# Patient Record
Sex: FEMALE | Race: WHITE | Employment: UNEMPLOYED | URBAN - METROPOLITAN AREA
[De-identification: names, ages, dates, MRNs, and addresses within clinical notes are randomized per-mention and may not be internally consistent; named-entity substitution may affect disease eponyms.]

---

## 2018-09-22 ENCOUNTER — HOSPITAL ENCOUNTER (OUTPATIENT)
Dept: MAMMOGRAPHY | Age: 38
Discharge: HOME | End: 2018-09-26
Payer: COMMERCIAL

## 2018-09-22 DIAGNOSIS — Z12.31 VISIT FOR SCREENING MAMMOGRAM: ICD-10-CM

## 2018-09-22 PROCEDURE — 77067 SCR MAMMO BI INCL CAD: CPT

## 2018-10-03 ENCOUNTER — HOSPITAL ENCOUNTER (OUTPATIENT)
Dept: ULTRASOUND IMAGING | Age: 38
Discharge: HOME | End: 2018-10-03
Payer: COMMERCIAL

## 2018-10-03 ENCOUNTER — HOSPITAL ENCOUNTER (OUTPATIENT)
Dept: WOMENS IMAGING | Age: 38
Discharge: HOME | End: 2018-10-03
Payer: COMMERCIAL

## 2018-10-03 DIAGNOSIS — R92.8 ABNORMAL FINDING ON MAMMOGRAPHY: ICD-10-CM

## 2018-10-03 PROCEDURE — G0279 TOMOSYNTHESIS, MAMMO: HCPCS

## 2018-10-03 PROCEDURE — 76642 ULTRASOUND BREAST LIMITED: CPT

## 2022-06-10 ENCOUNTER — TELEPHONE (OUTPATIENT)
Dept: ENDOCRINOLOGY | Age: 42
End: 2022-06-10

## 2022-06-10 NOTE — TELEPHONE ENCOUNTER
Fax coming from [facility] physicians sending referral to see Dr. Ana Bustillos     Dx - type 2 diabetes mellitus with hyperglycemia    Referring Dr Karan Price

## 2023-01-11 PROBLEM — I10 PRIMARY HYPERTENSION: Status: ACTIVE | Noted: 2023-01-11

## 2023-01-11 PROBLEM — E66.9 CLASS 1 OBESITY WITH BODY MASS INDEX (BMI) OF 33.0 TO 33.9 IN ADULT: Status: ACTIVE | Noted: 2023-01-11

## 2023-01-11 PROBLEM — E11.40 POORLY CONTROLLED TYPE 2 DIABETES MELLITUS WITH NEUROPATHY (HCC): Status: ACTIVE | Noted: 2023-01-11

## 2023-01-11 PROBLEM — E78.2 MIXED HYPERLIPIDEMIA: Status: ACTIVE | Noted: 2023-01-11

## 2023-01-11 PROBLEM — E11.65 POORLY CONTROLLED TYPE 2 DIABETES MELLITUS WITH NEUROPATHY (HCC): Status: ACTIVE | Noted: 2023-01-11

## 2023-01-12 ENCOUNTER — OFFICE VISIT (OUTPATIENT)
Dept: ENDOCRINOLOGY | Age: 43
End: 2023-01-12

## 2023-01-12 VITALS
WEIGHT: 197 LBS | HEIGHT: 72 IN | DIASTOLIC BLOOD PRESSURE: 78 MMHG | OXYGEN SATURATION: 96 % | HEART RATE: 88 BPM | BODY MASS INDEX: 26.68 KG/M2 | SYSTOLIC BLOOD PRESSURE: 138 MMHG | RESPIRATION RATE: 14 BRPM | TEMPERATURE: 98 F

## 2023-01-12 DIAGNOSIS — E55.9 VITAMIN D DEFICIENCY: ICD-10-CM

## 2023-01-12 DIAGNOSIS — E78.2 MIXED HYPERLIPIDEMIA: ICD-10-CM

## 2023-01-12 DIAGNOSIS — E04.9 GOITER: ICD-10-CM

## 2023-01-12 DIAGNOSIS — E83.52 HYPERCALCEMIA: ICD-10-CM

## 2023-01-12 DIAGNOSIS — E11.40 POORLY CONTROLLED TYPE 2 DIABETES MELLITUS WITH NEUROPATHY (HCC): Primary | ICD-10-CM

## 2023-01-12 DIAGNOSIS — E11.65 POORLY CONTROLLED TYPE 2 DIABETES MELLITUS WITH NEUROPATHY (HCC): Primary | ICD-10-CM

## 2023-01-12 DIAGNOSIS — I10 PRIMARY HYPERTENSION: ICD-10-CM

## 2023-01-12 RX ORDER — OXYCODONE AND ACETAMINOPHEN 7.5; 325 MG/1; MG/1
TABLET ORAL
COMMUNITY
Start: 2022-06-07

## 2023-01-12 RX ORDER — METHOCARBAMOL 750 MG/1
TABLET, FILM COATED ORAL
COMMUNITY
Start: 2018-11-30

## 2023-01-12 RX ORDER — INSULIN DEGLUDEC INJECTION 100 U/ML
10 INJECTION, SOLUTION SUBCUTANEOUS NIGHTLY
Qty: 5 ADJUSTABLE DOSE PRE-FILLED PEN SYRINGE | Refills: 3 | Status: SHIPPED
Start: 2023-01-12

## 2023-01-12 RX ORDER — LISINOPRIL 40 MG/1
TABLET ORAL
COMMUNITY

## 2023-01-12 RX ORDER — INSULIN GLARGINE 100 [IU]/ML
INJECTION, SOLUTION SUBCUTANEOUS
COMMUNITY
Start: 2022-12-03 | End: 2023-01-13 | Stop reason: ALTCHOICE

## 2023-01-12 RX ORDER — ERGOCALCIFEROL 1.25 MG/1
CAPSULE ORAL
COMMUNITY

## 2023-01-12 NOTE — PROGRESS NOTES
Gila Valdez is a 38 y.o. female who is being evaluated for Type 2 diabetes mellitus. Current symptoms/problems include  hyperglycemia  and are worsening. Poorly controlled type 2 diabetes mellitus with neuropathy ([facility] [E11.40, E11.65]    Diagnosed with Type 2 diabetes mellitus in 2016. Comorbid conditions:  HTN and Neuropathy    Current diabetic medications include: Lantus 24 units, but patient stopped it  Started insulin 2-3 years ago. Stopped because felt bad, had fatigue, constipated, felt sick. Feels better after she stopped it. Patient stated multiple times that she feels better when her sugars are in 300s and worse if he takes insulin. Intolerance to diabetes medications: Yes Metformin - nausea, upset stomach  Tradjenta did not work    Weight trend: stable  Prior visit with dietician: yes  Current diet: on average, 1 meal per day  Current exercise: has difficulty walking, but stays active     Current monitoring regimen: home blood tests - 0 times daily  Has brought blood glucose log/meter:  No  Home blood sugar records: fasting range: 230-400 and postprandial range: 230-400   Any episodes of hypoglycemia? No  Hypoglycemia frequency and time(s):    Does patient have Glucagon emergency kit? No  Does patient have rapid acting carbohydrate? No  Does patient wear a medic alert bracelet or necklace? No  Patient stated that she has not been trying to lose weight. Over 6 years lost significant amount of weight      2. Mixed hyperlipidemia  Has muscle pain    3. Primary hypertension  Has headaches    4. Goiter  Has difficulty swallowing  Has family history of thyroid disease in her mother    11. Vitamin D deficiency  Has fatigue    6. Hypercalcemia  No numbness or tingling        Anatomical Region Laterality Modality   -- -- Computed Tomography     Impression      1. Nonacute CT abdomen and pelvis     2. Hepatic steatosis and hepatomegaly. 3.  Splenomegaly     4.   No obstructive uropathy or calculus. 5.  Diverticulosis            This dictation was created with voice recognition software. While attempts have been made to review the dictation as it is transcribed, on occasion the spoken word can be misinterpreted by the technology leading to omissions or inappropriate words, phrases or sentences. Electronically Signed by: Billie Cee MD, 2/7/2019 11:21 PM  Narrative    PROCEDURE: CT-ABD/PELVIS NO CONTRAST     DEMOGRAPHICS: 34years old Female     INDICATION:              History:  L Flank Pain. Number of Series/Images:  4.       COMPARISON: No existing relevant imaging study corresponding to the same anatomical region is available. TECHNIQUE: Contiguous axial slices of the abdomen and pelvis were submitted without IV administration of contrast. No oral contrast was utilized. Additional coronal reformatted images were submitted. CT radiation dose optimization techniques (automated exposure control, and use of iterative reconstruction techniques, or adjustment of the mA and/or kV according to patient size) were used to limit patient radiation dose. FINDINGS:   CT ABDOMEN:     Inferior chest:  The lung bases are clear. The heart size is normal.  There is no pericardial or pleural effusion. Gallbladder: The gallbladder is incompletely distended. Biliary tree: There is no evidence for intra-or extrahepatic biliary ductal dilatation. Liver: Low density parenchymal. Right lobe 26 cm in length. Spleen: Spleen elongated to 15 cm     Pancreas: Normal morphology without masses or inflammatory changes. Adrenals: Normal size without masses. Kidneys: Normal size and morphology. No masses or hydronephrosis. No stones are identified. Vasculature: No evidence of aneurysm or other significant vascular pathology. Lymphatic system: No pathologically enlarged lymph nodes are seen. Bowel: The stomach is normally distended with no focal wall abnormality.  The duodenum is normal in caliber along its course. No focal abnormality is seen. The small bowel is normal caliber. There is no focal stricture or dilatation. There is some colonic diverticula in the sigmoid without acute inflammation. Peritoneal structures: No evidence of free air or free fluid. No masses. The omentum and small bowel mesentery are normal.     Retroperitoneum: No focal retroperitoneal abnormality is seen. Abdominal wall: The visualized portions of the abdominal wall are within normal limits. CT PELVIS:     Urinary bladder: The urinary bladder is distended with a smooth thin wall. No focal abnormalities are seen. Soft tissues: No other significant abnormalities in the pelvic structures. No free fluid or lymphadenopathy. The ischiorectal fossa and inguinal regions are normal. The left adnexal 3.6 cm cyst.     Bones: No significant abnormalities in the bony pelvis. Procedure Note    Brett Bunn MD - 02/07/2019   Formatting of this note might be different from the original.   PROCEDURE: CT-ABD/PELVIS NO CONTRAST     DEMOGRAPHICS: 34years old Female     INDICATION:              History:  L Flank Pain. Number of Series/Images:  4.       COMPARISON: No existing relevant imaging study corresponding to the same anatomical region is available. TECHNIQUE: Contiguous axial slices of the abdomen and pelvis were submitted without IV administration of contrast. No oral contrast was utilized. Additional coronal reformatted images were submitted. CT radiation dose optimization techniques (automated exposure control, and use of iterative reconstruction techniques, or adjustment of the mA and/or kV according to patient size) were used to limit patient radiation dose.       Past Medical History:   Diagnosis Date    Bone spur     Chronic back pain     Diabetes mellitus ([facility]     Migraine     Obesity       Patient Active Problem List   Diagnosis    Major depression    Poorly controlled type 2 diabetes mellitus with neuropathy (HCC)    Class 1 obesity with body mass index (BMI) of 33.0 to 33.9 in adult    Mixed hyperlipidemia    Primary hypertension    Goiter    Vitamin D deficiency     Past Surgical History:   Procedure Laterality Date    APPENDECTOMY       SECTION      [address] Bilateral 2003     Social History     Socioeconomic History    Marital status: Legally      Spouse name: Not on file    Number of children: Not on file    Years of education: Not on file    Highest education level: Not on file   Occupational History    Not on file   Tobacco Use    Smoking status: Every Day     Packs/day: 1.00     Years: 15.00     Pack years: 15.00     Types: Cigarettes    Smokeless tobacco: Never   Vaping Use    Vaping Use: Never used   Substance and Sexual Activity    Alcohol use: Not Currently     Comment: occasional use  last use 13 6 beers    Drug use: Never    Sexual activity: Yes     Partners: Male   Other Topics Concern    Not on file   Social History Narrative    Not on file     Social Determinants of Health     Financial Resource Strain: Not on file   Food Insecurity: Not on file   Transportation Needs: Not on file   Physical Activity: Not on file   Stress: Not on file   Social Connections: Not on file   Intimate Partner Violence: Not on file   Housing Stability: Not on file     Family History   Problem Relation Age of Onset    Cancer Father     Cancer Brother     Mental Illness Maternal Aunt      Current Outpatient Medications   Medication Sig Dispense Refill    LANTUS SOLOSTAR 100 UNIT/ML injection pen INJECT 20 UNITS SUBCUTANEOUSLY ONCE DAILY IN THE EVENING      oxyCODONE-acetaminophen (PERCOCET) 7.5-325 MG per tablet oxycodone-acetaminophen 7.5 mg-325 mg tablet   Take 1 tablet every 6 hours by oral route as directed for 30 days.       ergocalciferol (ERGOCALCIFEROL) 1.25 MG (88270 UT) capsule ergocalciferol (vitamin D2) 1,250 mcg (50,000 unit) capsule   TAKE 1 CAPSULE BY MOUTH ONCE A WEEK AS  DIRECTED      lisinopril (PRINIVIL;ZESTRIL) 40 MG tablet lisinopril 40 mg tablet   TAKE 1 TABLET BY MOUTH ONCE DAILY      linagliptin (TRADJENTA) 5 MG tablet Tradjenta 5 mg tablet   TAKE 1 TABLET BY MOUTH ONCE DAILY FOR 30 DAYS      methocarbamol (ROBAXIN) 750 MG tablet methocarbamol 750 mg tablet   TAKE 1 TABLET BY MOUTH THREE TIMES DAILY AS NEEDED FOR MUSCLE SPASM FOR UP TO 7 DAYS      Insulin Degludec (TRESIBA FLEXTOUCH) 100 UNIT/ML SOPN Inject 10 Units into the skin at bedtime 5 Adjustable Dose Pre-filled Pen Syringe 3    Nebulizers (COMPRESSOR/NEBULIZER) MISC 1 Device by Does not apply route 4 times daily. 1 each 3    albuterol (PROVENTIL) (2.5 MG/3ML) 0.083% nebulizer solution Take 3 mLs by nebulization every 4 hours as needed for Wheezing. 120 each 3    albuterol (PROVENTIL HFA) 108 (90 BASE) MCG/ACT inhaler Inhale 2 puffs into the lungs every 6 hours as needed for Wheezing (with spacer). 1 Inhaler 0     No current facility-administered medications for this visit.      Allergies   Allergen Reactions    Blue Dyes (Parenteral) Shortness Of Breath    Ibuprofen     Levofloxacin     Other      VICODIN    Pcn [Penicillins]     Sulfa Antibiotics      Family Status   Relation Name Status    Mother  Alive    Father      Brother  Alive    [address]          Lab Review:    Lab Results   Component Value Date/Time    WBC 7.3 2014 11:15 PM    HGB 13.4 2016 12:08 PM    HCT 40.8 2016 12:08 PM    MCV 89.1 2016 12:08 PM     2016 12:08 PM     Lab Results   Component Value Date/Time     2014 11:15 PM    K 3.9 2014 11:15 PM     2014 11:15 PM    CO2 23 2014 11:15 PM    BUN 13 2014 11:15 PM    CREATININE 0.6 2014 11:15 PM    GLUCOSE 127 2014 11:15 PM    CALCIUM 9.7 2014 11:15 PM    PROT 7.6 2014 11:15 PM    LABALBU 4.2 2014 11:15 PM    BILITOT 0.2 07/30/2014 11:15 PM    ALKPHOS 75 07/30/2014 11:15 PM    AST 11 07/30/2014 11:15 PM    ALT 8 07/30/2014 11:15 PM    LABGLOM >60 07/30/2014 11:15 PM    GFRAA >60 07/30/2014 11:15 PM    AGRATIO 1.2 07/30/2014 11:15 PM    GLOB 3.4 07/30/2014 11:15 PM     No results found for: TSHFT4, TSH, FT3  Lab Results   Component Value Date/Time    LABA1C 10.1 02/03/2013 01:14 PM     Lab Results   Component Value Date/Time    .2 02/03/2013 01:14 PM     No results found for: CHOL  No results found for: TRIG  No results found for: HDL  No results found for: LDLCHOLESTEROL, LDLCALC  No results found for: LABVLDL, VLDL  No results found for: CHOLHDLRATIO  Lab Results   Component Value Date/Time    LABMICR Not Indicated 04/04/2014 07:13 PM     No results found for: VITD25     Review of Systems:  Constitutional: has fatigue, no fever, no recent weight gain, has recent weight loss, has changes in appetite  Eyes: no eye pain, has change in vision, no eye redness, no eye irritation, no double vision  Ears, nose, throat: no nasal congestion, no sore throat, no earache, no decrease in hearing, no hoarseness, no dry mouth, no sinus problems, no difficulty swallowing, no neck lumps, no dental problems, no mouth sores, no ringing in ears  Pulmonary: has shortness of breath, no wheezing, has dyspnea on exertion, no cough  Cardiovascular: no chest pain, has lower extremity edema, no orthopnea, no intermittent leg claudication, no palpitations  Gastrointestinal: has abdominal pain, has nausea, has vomiting, no diarrhea, no constipation, no dysphagia, no heartburn, no bloating  Genitourinary: no dysuria, no urinary incontinence, no urinary hesitancy, no urinary frequency, no feelings of urinary urgency, no nocturia  Musculoskeletal: no joint swelling, no joint stiffness, has joint pain, no muscle cramps, no muscle pain, no bone pain  Integument/Breast: no hair loss, no skin rashes, no skin lesions, no itching, no dry skin  Neurological: has numbness, has tingling, has weakness, no confusion, has headaches, has dizziness, no fainting, no tremors, no decrease in memory, no balance problems  Psychiatric: has anxiety, has depression, has insomnia  Hematologic/Lymphatic: no tendency for easy bleeding, no swollen lymph nodes, no tendency for easy bruising  Immunology: no seasonal allergies, no frequent infections, no frequent illnesses  Endocrine: no temperature intolerance    /78   Pulse 88   Temp 98 °F (36.7 °C)   Resp 14   Ht 6' (1.829 m)   Wt 197 lb (89.4 kg)   SpO2 96%   BMI 26.72 kg/m²    Wt Readings from Last 3 Encounters:   01/12/23 197 lb (89.4 kg)   04/03/15 254 lb (115.2 kg)   08/01/14 239 lb 3.2 oz (108.5 kg)     Body mass index is 26.72 kg/m².       OBJECTIVE:  Constitutional: no acute distress, well appearing and well nourished  Psychiatric: oriented to person, place and time, judgement and insight and normal, recent and remote memory and intact and mood and affect are normal  Skin: skin and subcutaneous tissue is normal without mass, normal turgor  Head and Face: examination of head and face revealed no abnormalities  Eyes: no lid or conjunctival swelling, erythema or discharge, pupils are normal, equal, round, reactive to light  Ears/Nose: external inspection of ears and nose revealed no abnormalities, hearing is grossly normal  Oropharynx/Mouth/Face: lips, tongue and gums are normal with no lesions, the voice quality was normal  Neck: neck is supple and symmetric, with midline trachea and no masses, thyroid is enlarged  Lymphatics: normal cervical lymph nodes, normal supraclavicular nodes  Pulmonary: no increased work of breathing or signs of respiratory distress, lungs are clear to auscultation  Cardiovascular: normal heart rate and rhythm, normal S1 and S2, no murmurs and pedal pulses and 2+ bilaterally, 1+ edema  Abdomen: abdomen is soft, non-tender with no masses  Musculoskeletal: normal gait and station and exam of the digits and nails are normal  Neurological: normal coordination and normal general cortical function    Visual inspection:  Deformity/amputation: present - right bunion  Skin lesions/pre-ulcerative calluses: present - calluses mild  Edema: right- 1+, left- 1+    Sensory exam:  Monofilament sensation: abnormal   (minimum of 5 random plantar locations tested, avoiding callused areas - > 1 area with absence of sensation is + for neuropathy)    Plus at least one of the following:  Pulses: normal  Proprioception: Impaired  Vibration (128 Hz): Absent    ASSESSMENT/PLAN:  1. Poorly controlled type 2 diabetes mellitus with neuropathy ([facility]  Counseled patient about diabetes pathophysiology, basal and prandial glucose production, basal and prandial insulin requirements, compliance with taking medications and insulin, diabetes complication prevention, diabetes complications, risks if untreated, medications available for diabetes. Patient refused to take Lantus. She also refused Marcene Sherri, stated that it was ineffective. Patient is currently not taking any other medications other than pain medications. Family history significant in type 2 diabetes in her father. Will start Tresiba 10 units at bedtime and increase as tolerated. Patient has been noncompliant .  - C-Peptide; Future  - Lipid, Fasting; Future  - Microalbumin / Creatinine Urine Ratio; Future  - Anti-Thyroglobulin Antibody; Future  - Thyroid Peroxidase Antibody; Future  - Vitamin D 25 Hydroxy; Future    2. Mixed hyperlipidemia  Patient refused medications. Advised about consequences if untreated hyperlipidemia, including arrhythmia and heart attacks  - Lipid, Fasting; Future    3. Primary hypertension  Patient was prescribed lisinopril, but is not taking it.  - lisinopril (PRINIVIL;ZESTRIL) 40 MG tablet; lisinopril 40 mg tablet   TAKE 1 TABLET BY MOUTH ONCE DAILY  - Comprehensive Metabolic Panel; Future  - Anti-Thyroglobulin Antibody;  Future  - Thyroid Peroxidase Antibody; Future  - Vitamin D 25 Hydroxy; Future    4. Goiter  Obtain thyroid sonogram   - T3, Free; Future  - T4, Free; Future  - TSH; Future  - Anti-Thyroglobulin Antibody; Future  - Thyroid Peroxidase Antibody; Future    5. Vitamin D deficiency  Take vitamin D 50,000 international units weekly  - ergocalciferol (ERGOCALCIFEROL) 1.25 MG (72654 UT) capsule; ergocalciferol (vitamin D2) 1,250 mcg (50,000 unit) capsule   TAKE 1 CAPSULE BY MOUTH ONCE A WEEK AS  DIRECTED  - Comprehensive Metabolic Panel; Future  - Vitamin D 25 Hydroxy; Future      Reviewed and/or ordered clinical lab results Yes  Reviewed and/or ordered radiology tests Yes  Reviewed and/or ordered other diagnostic tests No  Discussed test results with performing physician No  Independently reviewed image, tracing, or specimen No  Made a decision to obtain old records No  Reviewed and summarized old records Yes  TSH 1.27  Triglyceride 383  HDL 60  LDL 24-30  Obtained history from other than patient No    Priya REGALADO was counseled regarding symptoms of diabetes, thyroid disease, hyperlipidemia, hypertension diagnosis, course and complications of disease if inadequately treated, side effects of medications, diagnosis, treatment options, and prognosis, risks, benefits, complications, and alternatives of treatment, labs, imaging and other studies and treatment targets and goals, compliance. She understands instructions and counseling. These diagnosis were discussed and reviewed with the patient including the advantages of drug therapy. She was counseled at this visit on the following: diabetes complication prevention and foot care. Total time I spent for this encounter gathering history, performing physical exam, coordinating care, counseling, and documenting in the chart - 60 minutes    Return in about 1 month (around 2/12/2023) for diabetes.     Electronically signed by Symone Wills MD on 1/12/2023 at 11:23 PM

## 2023-01-28 ENCOUNTER — TELEPHONE (OUTPATIENT)
Dept: ENDOCRINOLOGY | Age: 43
End: 2023-01-28

## 2023-01-28 DIAGNOSIS — E11.40 POORLY CONTROLLED TYPE 2 DIABETES MELLITUS WITH NEUROPATHY (HCC): ICD-10-CM

## 2023-01-28 DIAGNOSIS — E11.65 POORLY CONTROLLED TYPE 2 DIABETES MELLITUS WITH NEUROPATHY (HCC): ICD-10-CM

## 2023-01-29 NOTE — TELEPHONE ENCOUNTER
Patient has upcoming appointment and I will discuss results then. However, vitamin D came extremely low. It is currently 10. Please ask if she is taking or used to take vitamin D 50,000 international units weekly. It is on the list.  Blood glucose was very high 286 and triglycerides in the lipid panel were very high 834. That high triglycerides can cause pancreatitis. Need diabetes control to improve triglycerides. Because triglycerides are still high, I recommend fenofibrate in order to decrease triglycerides and decrease risk for pancreatitis. Let me know if patient is agreeable and I will send it to pharmacy. Hemoglobin A1c was 11.1. Please ask if she tried [place] insulin. If she is not taking vitamin D, ask if she wants me to send prescription to pharmacy or wait until appointment.

## 2023-01-30 RX ORDER — FENOFIBRATE 145 MG/1
145 TABLET, COATED ORAL DAILY
Qty: 30 TABLET | Refills: 3 | Status: SHIPPED | OUTPATIENT
Start: 2023-01-30

## 2023-01-30 NOTE — TELEPHONE ENCOUNTER
Called pt and reviewed below. Pt stated she was on an antibiotic and was told to stop taking Vitamin D. Pt will restart. Pt also agrees to start Fenofibrate. Pharmacy updated. Pt is also not taking [place]. Family

## 2023-01-31 RX ORDER — INSULIN DEGLUDEC INJECTION 100 U/ML
10 INJECTION, SOLUTION SUBCUTANEOUS NIGHTLY
Qty: 5 ADJUSTABLE DOSE PRE-FILLED PEN SYRINGE | Refills: 3 | Status: SHIPPED | OUTPATIENT
Start: 2023-01-31

## 2023-01-31 NOTE — TELEPHONE ENCOUNTER
I sent prescription to pharmacy for fenofibrate 145 mg 1 tablet daily. Let me know if she needs a vitamin D prescription. Also, just remind that I still recommend Tresiba as we discussed during appointment.